# Patient Record
Sex: FEMALE
[De-identification: names, ages, dates, MRNs, and addresses within clinical notes are randomized per-mention and may not be internally consistent; named-entity substitution may affect disease eponyms.]

---

## 2024-07-12 ENCOUNTER — NON-APPOINTMENT (OUTPATIENT)
Age: 44
End: 2024-07-12

## 2024-09-06 ENCOUNTER — NON-APPOINTMENT (OUTPATIENT)
Age: 44
End: 2024-09-06

## 2024-09-17 ENCOUNTER — APPOINTMENT (OUTPATIENT)
Dept: PHYSICAL MEDICINE AND REHAB | Facility: CLINIC | Age: 44
End: 2024-09-17
Payer: COMMERCIAL

## 2024-09-17 VITALS
WEIGHT: 245 LBS | HEIGHT: 68 IN | SYSTOLIC BLOOD PRESSURE: 149 MMHG | OXYGEN SATURATION: 100 % | RESPIRATION RATE: 14 BRPM | HEART RATE: 99 BPM | DIASTOLIC BLOOD PRESSURE: 98 MMHG | BODY MASS INDEX: 37.13 KG/M2

## 2024-09-17 DIAGNOSIS — M79.18 MYALGIA, OTHER SITE: ICD-10-CM

## 2024-09-17 DIAGNOSIS — G89.29 LUMBAGO WITH SCIATICA, LEFT SIDE: ICD-10-CM

## 2024-09-17 DIAGNOSIS — M54.42 LUMBAGO WITH SCIATICA, LEFT SIDE: ICD-10-CM

## 2024-09-17 PROCEDURE — 99204 OFFICE O/P NEW MOD 45 MIN: CPT

## 2024-09-17 RX ORDER — NAPROXEN 500 MG/1
500 TABLET ORAL
Refills: 0 | Status: ACTIVE | COMMUNITY

## 2024-09-17 RX ORDER — METHOCARBAMOL 750 MG/1
750 TABLET, FILM COATED ORAL TWICE DAILY
Qty: 60 | Refills: 1 | Status: ACTIVE | COMMUNITY
Start: 2024-09-17 | End: 1900-01-01

## 2024-09-17 RX ORDER — NAPROXEN 500 MG/1
500 TABLET ORAL
Qty: 60 | Refills: 1 | Status: ACTIVE | COMMUNITY
Start: 2024-09-17 | End: 1900-01-01

## 2024-09-17 RX ORDER — METHOCARBAMOL 1000 MG/1
TABLET, FILM COATED ORAL
Refills: 0 | Status: ACTIVE | COMMUNITY

## 2024-09-17 RX ORDER — AMLODIPINE BESYLATE 10 MG/1
10 TABLET ORAL
Refills: 0 | Status: ACTIVE | COMMUNITY

## 2024-09-17 RX ORDER — HYDROCHLOROTHIAZIDE 12.5 MG/1
12.5 CAPSULE ORAL
Refills: 0 | Status: ACTIVE | COMMUNITY

## 2024-09-17 NOTE — ASSESSMENT
[FreeTextEntry1] : Ms. GARCÍA ALVAREZ is a 43 year female who presents with acute on chronic low back pain.  No focal deficits on exam. Patient described left L4 radiculopathy, with symptoms significantly improved with initiation of medrol dose pack, naproxen and methocarbamol. She also has taut bands of irritable muscle over the left lower lumbar paraspinals, consistent with myofascial pain. At this time, patient is improving with conservative management. Would recommend continuing naproxen and methocarbamol on an as needed basis, and starting a course of PT.     Impression: low back pain ?lumbar radiculopathy lumbar myofascial pain   Plan:  - Urgent Care notes reviewed  - complete medrol dose pack as directed by PCP.  - continue naproxen 500mg PO BID PRN, rx sent - continue methocarbamol 750mg PO BID, rx sent - PT referral provided, emphasized on compliance to HEP. - discussed trial of nerve pain medicine, such as gabapentin, however patient's pain has significantly improved. would not start now.  - if pain persists or worsens, will consider further imaging.  - RTC in 3 months or sooner if pain returns     The patient expressed verbal understanding and is in agreement with the plan of care. All of the patient's questions and concerns were addressed during today's visit.

## 2024-09-17 NOTE — PHYSICAL EXAM
[FreeTextEntry1] : Constitutional: In NAD, calm and cooperative Heart: well perfused Lungs: nonlabored breathing MSK (Back) Inspection: no gross swelling identified, no scars Palpation: Tenderness of the left lower lumbar paraspinals,  focal areas of hyperirritable, palpable, taut bands of muscles that reproduce pain referral pattern and twitch response with palpation ROM: Pain at end lumbar flexion Strength: 5/5 strength in bilateral lower extremities Reflexes: 2+ Patella reflex bilaterally, 2+ Achilles reflex bilaterally, negative clonus bilaterally Sensation: Intact to light touch in bilateral lower extremities Special tests: SLR: currently only causes tingling to go into the left buttock and posterior thigh, patient states prior to UC visit, symptoms would go down LLE in L4 pattern.  PAUL: neg BL FADIR: neg BL Thigh Thrust: neg BL Maryam's Finger: neg BL Facet loading:neg BL

## 2024-09-17 NOTE — REVIEW OF SYSTEMS
[Fever] : no fever [Chills] : no chills [Chest Pain] : no chest pain [Shortness Of Breath] : no shortness of breath [FreeTextEntry9] : +back pain

## 2024-09-17 NOTE — HISTORY OF PRESENT ILLNESS
[FreeTextEntry1] : Ms. GARCÍA ALVAREZ is a 43 year female who presents with low back pain      HPI  09/17/2024 Location: low back pain  Onset: Aug 2024, denies inciting injury or trauma. Has hx of lumbar strain from work, with occasional exacerbations, which get better with conservative care. She notes that over the past 1 year, exacerbations have been occurring more frequently. Was seen at [facility] as her pain became 10/10, prescribed medrol dose pack, naproxen, which is providing relief.  Provocation/Palliative: worsens with prolonged walking, (after working 12 hours shifts without a break)  Quality: sharp, stabbing.  Radiation: pain and electrical senations, LLE into her anterior lateral thigh, across shin into medial malleolus.  Severity:  0-10/10   Denies any associated numbness. Denies any associated leg weakness. Denies any loss of bowel/bladder control or any groin numbness.   Current pain medications: medrol dose pack naproxen methocarbamol   Previous medications trialed: motrin    Previous procedures relevant to complaint: Injections: none for spine or joints Surgery: none for spine or joints    Conservative therapy tried: Physical Therapy: + in the past, helpful     Diagnostic studies reviewed by me: XR L spine: patient unsure where xrays were taken.

## 2024-11-18 ENCOUNTER — NON-APPOINTMENT (OUTPATIENT)
Age: 44
End: 2024-11-18

## 2024-12-17 ENCOUNTER — APPOINTMENT (OUTPATIENT)
Dept: PHYSICAL MEDICINE AND REHAB | Facility: CLINIC | Age: 44
End: 2024-12-17

## 2025-05-31 ENCOUNTER — NON-APPOINTMENT (OUTPATIENT)
Age: 45
End: 2025-05-31